# Patient Record
Sex: MALE | Race: WHITE
[De-identification: names, ages, dates, MRNs, and addresses within clinical notes are randomized per-mention and may not be internally consistent; named-entity substitution may affect disease eponyms.]

---

## 2017-04-28 ENCOUNTER — HOSPITAL ENCOUNTER (OUTPATIENT)
Dept: HOSPITAL 45 - C.LABMFLN | Age: 77
End: 2017-04-28
Attending: FAMILY MEDICINE
Payer: COMMERCIAL

## 2017-04-28 DIAGNOSIS — I10: Primary | ICD-10-CM

## 2017-04-28 DIAGNOSIS — Z87.442: ICD-10-CM

## 2017-04-28 DIAGNOSIS — E78.00: ICD-10-CM

## 2017-04-28 LAB
ALT SERPL-CCNC: 33 U/L (ref 12–78)
ANION GAP SERPL CALC-SCNC: 8 MMOL/L (ref 3–11)
AST SERPL-CCNC: 27 U/L (ref 15–37)
BUN SERPL-MCNC: 21 MG/DL (ref 7–18)
BUN/CREAT SERPL: 19.2 (ref 10–20)
CALCIUM SERPL-MCNC: 9.3 MG/DL (ref 8.5–10.1)
CHLORIDE SERPL-SCNC: 108 MMOL/L (ref 98–107)
CHOLEST/HDLC SERPL: 2.8 {RATIO}
CO2 SERPL-SCNC: 26 MMOL/L (ref 21–32)
CREAT SERPL-MCNC: 1.1 MG/DL (ref 0.6–1.4)
GLUCOSE SERPL-MCNC: 101 MG/DL (ref 70–99)
GLUCOSE UR QL: 40 MG/DL
KETONES UR QL STRIP: 50 MG/DL
NITRITE UR QL STRIP: 114 MG/DL (ref 0–150)
PH UR: 113 MG/DL (ref 0–200)
POTASSIUM SERPL-SCNC: 4.6 MMOL/L (ref 3.5–5.1)
SODIUM SERPL-SCNC: 142 MMOL/L (ref 136–145)
URATE SERPL-MCNC: 4.8 MG/DL (ref 2.6–7.2)
VERY LOW DENSITY LIPOPROT CALC: 23 MG/DL

## 2018-04-27 ENCOUNTER — HOSPITAL ENCOUNTER (OUTPATIENT)
Dept: HOSPITAL 45 - C.LABMFLN | Age: 78
Discharge: HOME | End: 2018-04-27
Attending: FAMILY MEDICINE
Payer: COMMERCIAL

## 2018-04-27 DIAGNOSIS — E78.00: ICD-10-CM

## 2018-04-27 DIAGNOSIS — N20.0: ICD-10-CM

## 2018-04-27 DIAGNOSIS — I10: Primary | ICD-10-CM

## 2018-04-27 LAB
ALT SERPL-CCNC: 35 U/L (ref 12–78)
AST SERPL-CCNC: 32 U/L (ref 15–37)
BUN SERPL-MCNC: 17 MG/DL (ref 7–18)
CALCIUM SERPL-MCNC: 9.2 MG/DL (ref 8.5–10.1)
CO2 SERPL-SCNC: 27 MMOL/L (ref 21–32)
CREAT SERPL-MCNC: 1 MG/DL (ref 0.6–1.4)
GLUCOSE SERPL-MCNC: 98 MG/DL (ref 70–99)
KETONES UR QL STRIP: 45 MG/DL
PH UR: 114 MG/DL (ref 0–200)
POTASSIUM SERPL-SCNC: 3.9 MMOL/L (ref 3.5–5.1)
SODIUM SERPL-SCNC: 138 MMOL/L (ref 136–145)
URATE SERPL-MCNC: 3.7 MG/DL (ref 2.6–7.2)

## 2018-08-01 ENCOUNTER — HOSPITAL ENCOUNTER (OUTPATIENT)
Dept: HOSPITAL 45 - C.RAD | Age: 78
Discharge: HOME | End: 2018-08-01
Attending: UROLOGY
Payer: COMMERCIAL

## 2018-08-01 DIAGNOSIS — N20.0: Primary | ICD-10-CM

## 2018-08-01 NOTE — DIAGNOSTIC IMAGING REPORT
KUB



HISTORY:      N20.0 Nephrolithiasis



COMPARISON: None.



FINDINGS: The bowel gas pattern is unremarkable. There are no dilated loops of

small bowel to suggest an obstruction.  There again noted 2 stones within the

left kidney with the largest measuring 5 mm. These are not significantly

changed. The right renal shadow is mostly obscured by overlying bowel gas. No

definite right renal or ureteral calculi. Calcifications in the deep pelvis

likely represent phleboliths. No pneumoperitoneum or pneumatosis.



IMPRESSION:  

Stable left-sided nephrolithiasis.







Electronically signed by:  Иван Waller M.D.

8/1/2018 12:02 PM



Dictated Date/Time:  8/1/2018 12:01 PM